# Patient Record
Sex: MALE | Race: WHITE | URBAN - METROPOLITAN AREA
[De-identification: names, ages, dates, MRNs, and addresses within clinical notes are randomized per-mention and may not be internally consistent; named-entity substitution may affect disease eponyms.]

---

## 2017-04-11 ENCOUNTER — OFFICE VISIT (OUTPATIENT)
Dept: URGENT CARE | Facility: URGENT CARE | Age: 57
End: 2017-04-11
Payer: COMMERCIAL

## 2017-04-11 VITALS
BODY MASS INDEX: 33.86 KG/M2 | SYSTOLIC BLOOD PRESSURE: 145 MMHG | DIASTOLIC BLOOD PRESSURE: 93 MMHG | TEMPERATURE: 97.3 F | HEART RATE: 70 BPM | OXYGEN SATURATION: 95 % | WEIGHT: 219.4 LBS

## 2017-04-11 DIAGNOSIS — R07.89 CHEST TIGHTNESS: Primary | ICD-10-CM

## 2017-04-11 LAB — TROPONIN I SERPL-MCNC: 0.06 UG/L (ref 0–0.04)

## 2017-04-11 PROCEDURE — 84484 ASSAY OF TROPONIN QUANT: CPT | Performed by: FAMILY MEDICINE

## 2017-04-11 PROCEDURE — 99213 OFFICE O/P EST LOW 20 MIN: CPT | Performed by: FAMILY MEDICINE

## 2017-04-11 PROCEDURE — 36415 COLL VENOUS BLD VENIPUNCTURE: CPT | Performed by: FAMILY MEDICINE

## 2017-04-11 PROCEDURE — 93000 ELECTROCARDIOGRAM COMPLETE: CPT | Performed by: FAMILY MEDICINE

## 2017-04-11 RX ORDER — ATORVASTATIN CALCIUM 20 MG/1
20 TABLET, FILM COATED ORAL DAILY
Refills: 8 | COMMUNITY
Start: 2017-03-20

## 2017-04-11 RX ORDER — LOSARTAN POTASSIUM AND HYDROCHLOROTHIAZIDE 12.5; 5 MG/1; MG/1
TABLET ORAL
Refills: 0 | COMMUNITY
Start: 2017-03-28

## 2017-04-11 NOTE — MR AVS SNAPSHOT
"              After Visit Summary   2017    Gabriel Tolentino    MRN: [id number]           Patient Information     Date Of Birth          1960        Visit Information        Provider Department      2017 5:00 PM Braden Ojeda MD [facility]        Today's Diagnoses     Chest tightness    -  1       Follow-ups after your visit        Who to contact     If you have questions or need follow up information about today's clinic visit or your schedule please contact [facility] directly at [phone].  Normal or non-critical lab and imaging results will be communicated to you by Synchronyhart, letter or phone within 4 business days after the clinic has received the results. If you do not hear from us within 7 days, please contact the clinic through Synchronyhart or phone. If you have a critical or abnormal lab result, we will notify you by phone as soon as possible.  Submit refill requests through Gyros or call your pharmacy and they will forward the refill request to us. Please allow 3 business days for your refill to be completed.          Additional Information About Your Visit        MyChart Information     Gyros lets you send messages to your doctor, view your test results, renew your prescriptions, schedule appointments and more. To sign up, go to [email or web address] . Click on \"Log in\" on the left side of the screen, which will take you to the Welcome page. Then click on \"Sign up Now\" on the right side of the page.     You will be asked to enter the access code listed below, as well as some personal information. Please follow the directions to create your username and password.     Your access code is: [email or web address]  Expires: 7/10/2017  6:48 PM     Your access code will  in 90 days. If you need help or a new code, please call your [facility] or [phone].        Care EveryWhere ID     This is your Care EveryWhere ID. This could be used by other " organizations to access your [facility] medical records  [id number]        Your Vitals Were     Pulse Temperature Pulse Oximetry BMI (Body Mass Index)          70 97.3  F (36.3  C) (Oral) 95% 33.86 kg/m2         Blood Pressure from Last 3 Encounters:   04/11/17 (!) 145/93   11/26/13 131/90   04/09/13 130/90    Weight from Last 3 Encounters:   04/11/17 219 lb 6.4 oz (99.5 kg)   11/19/13 205 lb (93 kg)   04/09/13 211 lb (95.7 kg)              We Performed the Following     EKG 12-lead complete w/read - Clinics     EKG 12-lead complete w/read - Clinics     Troponin I        Primary Care Provider Office Phone # Fax #    Eleuterio Kelsey [phone]       [facility] [address]  [facility] [address]        Thank you!     Thank you for choosing [facility]  for your care. Our goal is always to provide you with excellent care. Hearing back from our patients is one way we can continue to improve our services. Please take a few minutes to complete the written survey that you may receive in the mail after your visit with us. Thank you!             Your Updated Medication List - Protect others around you: Learn how to safely use, store and throw away your medicines at www.disposemymeds.org.          This list is accurate as of: 4/11/17  6:49 PM.  Always use your most recent med list.                   Brand Name Dispense Instructions for use    atorvastatin 20 MG tablet    LIPITOR     Take 20 mg by mouth daily       losartan-hydrochlorothiazide 50-12.5 MG per tablet    HYZAAR     TAKE 1 TABLET BY MOUTH EVERY MORNING AS DIRECTED

## 2017-04-11 NOTE — NURSING NOTE
"Chief Complaint   Patient presents with     Gastric Problem       Initial BP (!) 145/93 (BP Location: Left arm, Patient Position: Chair, Cuff Size: Adult Large)  Pulse 70  Temp 97.3  F (36.3  C) (Oral)  Wt 219 lb 6.4 oz (99.5 kg)  SpO2 95%  BMI 33.86 kg/m2 Estimated body mass index is 33.86 kg/(m^2) as calculated from the following:    Height as of 11/19/13: 5' 7.5\" (1.715 m).    Weight as of this encounter: 219 lb 6.4 oz (99.5 kg).  Medication Reconciliation: complete     Melita Bear MA    "

## 2017-04-11 NOTE — PROGRESS NOTES
"Some of this note was populated by a medical assistant.      SUBJECTIVE:                                                    Gabriel Tolentino is a 56 year old male who presents to clinic today for the following health issues:      Gastrointestinal symptoms      Duration: 2 days    Description:           REFLUX SYMPTOMS - chest pain (earlier), tightness while on the treadmill this morning apparently was feeling central chest tightness \"indigestion\" while on the treadmill, however, continued his 60 minute walk. Apparently chest pain seems to improve shortly thereafter but had another brief bout of chest tightness while ascending the stairs earlier today.     Denies nausea, vomiting or jaw pain at that time. Denies prior heart hx.     Intensity:  moderate    Accompanying signs and symptoms:  none    History  Previous {similar problem: no   Previous evaluation:  none    Aggravating factors: meals, fatty meals and lying down    Alleviating factors: nothing    Other Therapies tried: Tums     Denies previous hx of GERD in the past.   Using stairs today and noted to have     father hx -- microvascular angina -- 84 years old currently and still living.       Problem list and histories reviewed & adjusted, as indicated.  Additional history: as documented    Patient Active Problem List   Diagnosis     LLQ abdominal pain     CARDIOVASCULAR SCREENING; LDL GOAL LESS THAN 160     Elevated blood pressure     Past Surgical History:   Procedure Laterality Date     COLONOSCOPY  11/26/2013    Procedure: COLONOSCOPY;  COLONOSCOPY-SCREENING / HO;  Surgeon: Cristo Palmer MD;  Location:  OR       Social History   Substance Use Topics     Smoking status: Never Smoker     Smokeless tobacco: Never Used     Alcohol use Yes     Family History   Problem Relation Age of Onset     C.A.D. Father      HEART DISEASE Father      Angina     CANCER Maternal Grandfather          Current Outpatient Prescriptions   Medication Sig Dispense Refill     " atorvastatin (LIPITOR) 20 MG tablet Take 20 mg by mouth daily  8     losartan-hydrochlorothiazide (HYZAAR) 50-12.5 MG per tablet TAKE 1 TABLET BY MOUTH EVERY MORNING AS DIRECTED  0     No Known Allergies    Reviewed and updated as needed this visit by clinical staff  Tobacco  Allergies       Reviewed and updated as needed this visit by Provider         ROS:  Constitutional, HEENT, cardiovascular, pulmonary, gi and gu systems are negative, except as otherwise noted.    OBJECTIVE:                                                    BP (!) 145/93 (BP Location: Left arm, Patient Position: Chair, Cuff Size: Adult Large)  Pulse 70  Temp 97.3  F (36.3  C) (Oral)  Wt 219 lb 6.4 oz (99.5 kg)  SpO2 95%  BMI 33.86 kg/m2  Body mass index is 33.86 kg/(m^2).  GENERAL: healthy, alert and no distress  NECK: no adenopathy, no asymmetry, masses, or scars and thyroid normal to palpation  RESP: lungs clear to auscultation - no rales, rhonchi or wheezes  CV: regular rate and rhythm, normal S1 S2, no S3 or S4, no murmur, click or rub, no peripheral edema and peripheral pulses strong  ABDOMEN: soft, nontender, no hepatosplenomegaly, no masses and bowel sounds normal  MS: no gross musculoskeletal defects noted, no edema      EKG: WNL sinus rhythm normal axis, intervals.   Results for orders placed or performed in visit on 04/11/17   Troponin I   Result Value Ref Range    Troponin I ES 0.059 (H) 0.000 - 0.045 ug/L          ASSESSMENT/PLAN:                                                        ICD-10-CM    1. Chest tightness R07.89 EKG 12-lead complete w/read - Clinics     EKG 12-lead complete w/read - Clinics     Troponin I      Noted slight elevation in troponin and have advised him to seek repeat troponin level and observation in the ER.   Consider further cardiac testing. Chest pain does seem to have improved.   Did recommend that he take a daily ASA as well.   He agrees to go directly to the ER at [facility] for further  testing. His wife will take him via car.     Braden Ojeda MD  [facility]

## 2017-04-12 NOTE — PROGRESS NOTES
Reviewed this result with Gabriel and advised to follow-up directly to ER even though chest pain seems to be improving.     Braden Ojeda MD